# Patient Record
Sex: MALE | Race: WHITE | URBAN - METROPOLITAN AREA
[De-identification: names, ages, dates, MRNs, and addresses within clinical notes are randomized per-mention and may not be internally consistent; named-entity substitution may affect disease eponyms.]

---

## 2017-11-14 ENCOUNTER — HOSPITAL ENCOUNTER (OUTPATIENT)
Dept: OTHER | Age: 62
Discharge: HOME | End: 2017-11-30
Attending: PSYCHIATRY & NEUROLOGY | Admitting: PSYCHIATRY & NEUROLOGY

## 2017-11-14 NOTE — PLAN OF CARE
Outpatient Physical Therapy  [] [facility]    Phone: [phone]   Fax: [phone]   [x] [facility]  Phone: [phone]              Fax: [phone]  [] [facility]   Phone: [phone]   Fax: [phone]     To: Referring Practitioner: Dr. Hugo Nicholson      Patient: Kush Yates   : 1955   MRN: [id number]  Evaluation Date: 2017      Diagnosis Information:  · Diagnosis: Multiple Sclerosis   · Treatment Diagnosis: Hip Flexor and core weakness that is affecting gait on uneven surfaces     Physical Therapy Certification Form  Dear Dr. Hugo Nicholson,  The following patient has been evaluated for physical therapy services and for therapy to continue, Medicare requires monthly physician review of the treatment plan. Please review the attached evaluation and/or summary of the patient's plan of care, and verify that you agree therapy should continue by signing the attached document and sending it back to our office. Plan of Care/Treatment to date:  [x] Therapeutic Exercise    [] Modalities:  [x] Therapeutic Activity     [] Ultrasound  [] Electrical Stimulation  [x] Gait Training      [] Cervical Traction [] Lumbar Traction  [] Neuromuscular Re-education    [] Cold/hotpack [] Iontophoresis   [x] Instruction in HEP     Other:  [x] Manual Therapy      []             [] Aquatic Therapy      []           ? Frequency/Duration:  # Days per week: [] 1 day # Weeks: [] 1 week [] 5 weeks     [x] 2 days? [] 2 weeks [x] 6 weeks     [] 3 days   [] 3 weeks [] 7 weeks     [] 4 days   [] 4 weeks [] 8 weeks    Rehab Potential: [] Excellent [x] Good [] Fair  [] Poor       Electronically signed by:  Violet Syed PT      If you have any questions or concerns, please don't hesitate to call.   Thank you for your referral.      Physician Signature:________________________________Date:__________________  By signing above, therapists plan is approved by physician

## 2017-11-14 NOTE — FLOWSHEET NOTE
Physical Therapy Daily Treatment Note  Date:  2017    Patient Name:  Ofelia Mcdaniel    :  1955  MRN: [id number]  Restrictions/Precautions:  Multiple Sclerosis- not in exacerbation for past 30 years. Pertinent Medical History: Triple Bypass surgery. Eye surgery when child  Medical/Treatment Diagnosis Information:  · Diagnosis: Multiple Sclerosis  · Treatment Diagnosis: Hip Flexor and core weakness that is affecting gait on uneven surfaces  Insurance/Certification information:  PT Insurance Information: [facility]- 61 visits  Physician Information:  Referring Practitioner: Dr. Inés Hess of care signed (Y/N):    Visit# / total visits:    Pain level: 0/10     G-Code (if applicable):      Date / Visit # G-Code Applied:  /  PT G-Codes  Functional Assessment Tool Used: LEFS  Score: 57  Functional Limitation: Mobility: Walking and moving around  Mobility: Walking and Moving Around Current Status (): At least 20 percent but less than 40 percent impaired, limited or restricted  Mobility: Walking and Moving Around Goal Status (): 0 percent impaired, limited or restricted    Progress Note: []  Yes  []  No  Next due by: Visit #10      History of Injury:  Pt states he has noticed recently he has fallen down more and is concerned by it. States he had one bad fall where he hit his face on the ground. Pt states he had MS flare up in his 35s and has been dormant since. States he does notice his left toe catch on the step often. Pt states he normally looses his balance forward. States he is working on weight loss as well to help with it.  Pt states his vision is good and strength seems good          Subjective:       Objective:  Observation:   Test measurements:      Exercises:  Exercise/Equipment Resistance/Repetitions Other comments                                           HEP      Alt Marching in supine  Emphasized holding PPT during   PPT      SLR                 Other Therapeutic Activities:

## 2017-11-14 NOTE — PROGRESS NOTES
The Lower Extremity Functional Scale    Patient: Nadeen Hayden  : 1955  MRN: [id number]  Date: 2017  Electronically Signed by: Jimmy moe     We are interested in knowing whether you are having any difficulty at all with the activities listed below because of your lower limb problem for which you are currently seeking attention. Please provide an answer for each activity.          Today would you have difficulty at all with:    Activities Extreme Difficulty or Unable to Perform Activity Quite a Bit of Difficulty Moderate Difficulty A Little Bit of Difficulty No Difficulty   1 Any of your usual work, housework, or school activities []0 []1 []2  []3 [x]4   2 Your usual hobbies, recreational, or sporting activities []0 []1 []2 []3 [x]4   3 Getting into or out of the bath []0 []1 []2 [x]3 []4   4 Walking between rooms []0 []1 []2 []3 [x]4   5 Putting on your shoes or socks []0 []1 [x]2 []3 []4   6 Squatting []0 []1 []2 [x]3 []4   7 Lifting an object, like a bag of groceries from the floor []0 []1 []2 []3 [x]4   8 Performing light activities around your home []0 []1 []2 []3 [x]4   9 Performing heavy activities around your home []0 []1 []2 []3 [x]4   10 Getting into or out of a car []0 []1 []2 []3 [x]4   11 Walking 2 blocks []0 []1 []2 []3 [x]4   12 Walking a mile []0 []1 []2 [x]3 []4   13 Going up or down 10 stairs (about 1 flight of stairs) []0 []1 []2 []3 [x]4   14 Standing for 1 hour []0 []1 []2 []3 [x]4   15 Sitting for 1 hour []0 []1 []2 []3 [x]4   16 Running on even ground  []0 []1 []2 []3 []4   17 Running on uneven ground  [x]0 []1 []2 []3 []4   18 Making sharp turns while running fast  [x]0 []1 []2 []3 []4   19 Hopping []0 [x]1 []2 []3 []4   20 Rolling over in bed []0 []1 []2 []3 [x]4    Column Totals:            Patient Score from Above:  57    (Patient Score / 80) X 100:  %                          Scoring Method for Lower Extremity Functional Scale    The Lower Extremity Functional Scale (LEFS) is an easily administered and scored functional outcome tool. It can be utilized for lower extremity conditions and is sensitive enough for a wide range of functional disability levels. It can and should be used on the initial visit and subsequently on a 2- 3 week basis to measure patient's progress. The tool has a sufficient measure of reliability, variability, and sensitivity to change for determining minimally clinically important score differences, on a test to re-test basis. Scoring:   LEFS Score = (Sum of Responses / 80) X 100    Error + / - 5 points, Minimum Level of Detectable Change (90% Confidence): 9 Points     DAVID Landeros, [place]LAH Foreman, LUDIN [employer] [address] [place] [address], The Lower Extremity Functional Scale: Scale development, measurement properties, and clinical application, Physical Therapy, 1999, 79, [id number], with permission of the [employer] Association.       G-Code Crosswalk:  LEFS Total Score Disability Index CMS Modifier   80 0% []CH   79-65 1-19% []CI   64-49 20-39% [x]CJ   48-33 40-59% []CK   32-17 60-79% []CL   16-1 80-99% []CM   0 100% []CN

## 2017-12-01 ENCOUNTER — HOSPITAL ENCOUNTER (OUTPATIENT)
Dept: OTHER | Age: 62
Discharge: HOME | End: 2017-12-31
Attending: PSYCHIATRY & NEUROLOGY | Admitting: PSYCHIATRY & NEUROLOGY

## 2018-01-01 ENCOUNTER — HOSPITAL ENCOUNTER (OUTPATIENT)
Dept: OTHER | Age: 63
Discharge: HOME | End: 2018-01-10
Attending: PSYCHIATRY & NEUROLOGY | Admitting: PSYCHIATRY & NEUROLOGY